# Patient Record
Sex: FEMALE | Race: WHITE | NOT HISPANIC OR LATINO | Employment: STUDENT | URBAN - METROPOLITAN AREA
[De-identification: names, ages, dates, MRNs, and addresses within clinical notes are randomized per-mention and may not be internally consistent; named-entity substitution may affect disease eponyms.]

---

## 2022-12-05 ENCOUNTER — HOSPITAL ENCOUNTER (EMERGENCY)
Facility: CLINIC | Age: 22
Discharge: LEFT BEFORE DISCHARGE | End: 2022-12-06
Attending: EMERGENCY MEDICINE | Admitting: EMERGENCY MEDICINE
Payer: COMMERCIAL

## 2022-12-05 DIAGNOSIS — Z53.21 ELOPED FROM EMERGENCY DEPARTMENT: ICD-10-CM

## 2022-12-05 DIAGNOSIS — Z72.89 SELF-INJURIOUS BEHAVIOR: ICD-10-CM

## 2022-12-05 LAB
ALBUMIN SERPL BCG-MCNC: 4.7 G/DL (ref 3.5–5.2)
ALP SERPL-CCNC: 88 U/L (ref 35–104)
ALT SERPL W P-5'-P-CCNC: 17 U/L (ref 10–35)
ANION GAP SERPL CALCULATED.3IONS-SCNC: 13 MMOL/L (ref 7–15)
AST SERPL W P-5'-P-CCNC: 20 U/L (ref 10–35)
BASOPHILS # BLD AUTO: 0.1 10E3/UL (ref 0–0.2)
BASOPHILS NFR BLD AUTO: 0 %
BILIRUB SERPL-MCNC: 0.4 MG/DL
BUN SERPL-MCNC: 13.5 MG/DL (ref 6–20)
CALCIUM SERPL-MCNC: 9.4 MG/DL (ref 8.6–10)
CHLORIDE SERPL-SCNC: 104 MMOL/L (ref 98–107)
CREAT SERPL-MCNC: 0.61 MG/DL (ref 0.51–0.95)
DEPRECATED HCO3 PLAS-SCNC: 22 MMOL/L (ref 22–29)
EOSINOPHIL # BLD AUTO: 0.2 10E3/UL (ref 0–0.7)
EOSINOPHIL NFR BLD AUTO: 2 %
ERYTHROCYTE [DISTWIDTH] IN BLOOD BY AUTOMATED COUNT: 12.6 % (ref 10–15)
ETHANOL SERPL-MCNC: <0.01 G/DL
GFR SERPL CREATININE-BSD FRML MDRD: >90 ML/MIN/1.73M2
GLUCOSE SERPL-MCNC: 89 MG/DL (ref 70–99)
HCG UR QL: NEGATIVE
HCT VFR BLD AUTO: 44.4 % (ref 35–47)
HGB BLD-MCNC: 14.4 G/DL (ref 11.7–15.7)
IMM GRANULOCYTES # BLD: 0.1 10E3/UL
IMM GRANULOCYTES NFR BLD: 0 %
LYMPHOCYTES # BLD AUTO: 4.3 10E3/UL (ref 0.8–5.3)
LYMPHOCYTES NFR BLD AUTO: 31 %
MCH RBC QN AUTO: 27.6 PG (ref 26.5–33)
MCHC RBC AUTO-ENTMCNC: 32.4 G/DL (ref 31.5–36.5)
MCV RBC AUTO: 85 FL (ref 78–100)
MONOCYTES # BLD AUTO: 0.8 10E3/UL (ref 0–1.3)
MONOCYTES NFR BLD AUTO: 6 %
NEUTROPHILS # BLD AUTO: 8.5 10E3/UL (ref 1.6–8.3)
NEUTROPHILS NFR BLD AUTO: 61 %
NRBC # BLD AUTO: 0 10E3/UL
NRBC BLD AUTO-RTO: 0 /100
PLATELET # BLD AUTO: 394 10E3/UL (ref 150–450)
POTASSIUM SERPL-SCNC: 3.6 MMOL/L (ref 3.4–5.3)
PROT SERPL-MCNC: 7.8 G/DL (ref 6.4–8.3)
RBC # BLD AUTO: 5.21 10E6/UL (ref 3.8–5.2)
SODIUM SERPL-SCNC: 139 MMOL/L (ref 136–145)
WBC # BLD AUTO: 13.8 10E3/UL (ref 4–11)

## 2022-12-05 PROCEDURE — 85025 COMPLETE CBC W/AUTO DIFF WBC: CPT | Performed by: EMERGENCY MEDICINE

## 2022-12-05 PROCEDURE — 36415 COLL VENOUS BLD VENIPUNCTURE: CPT | Performed by: EMERGENCY MEDICINE

## 2022-12-05 PROCEDURE — 99282 EMERGENCY DEPT VISIT SF MDM: CPT | Performed by: EMERGENCY MEDICINE

## 2022-12-05 PROCEDURE — 80307 DRUG TEST PRSMV CHEM ANLYZR: CPT | Performed by: EMERGENCY MEDICINE

## 2022-12-05 PROCEDURE — 99283 EMERGENCY DEPT VISIT LOW MDM: CPT | Performed by: EMERGENCY MEDICINE

## 2022-12-05 PROCEDURE — 82077 ASSAY SPEC XCP UR&BREATH IA: CPT | Performed by: EMERGENCY MEDICINE

## 2022-12-05 PROCEDURE — 80053 COMPREHEN METABOLIC PANEL: CPT | Performed by: EMERGENCY MEDICINE

## 2022-12-05 PROCEDURE — 81025 URINE PREGNANCY TEST: CPT | Performed by: EMERGENCY MEDICINE

## 2022-12-05 PROCEDURE — 250N000013 HC RX MED GY IP 250 OP 250 PS 637: Performed by: EMERGENCY MEDICINE

## 2022-12-05 RX ORDER — ACETAMINOPHEN 325 MG/1
650 TABLET ORAL ONCE
Status: COMPLETED | OUTPATIENT
Start: 2022-12-05 | End: 2022-12-05

## 2022-12-05 RX ADMIN — ACETAMINOPHEN 650 MG: 325 TABLET, FILM COATED ORAL at 23:17

## 2022-12-05 ASSESSMENT — ENCOUNTER SYMPTOMS
DIFFICULTY URINATING: 0
HEADACHES: 1
SORE THROAT: 0
NECK PAIN: 1
ABDOMINAL PAIN: 0
CONFUSION: 0
DYSPHORIC MOOD: 1
ARTHRALGIAS: 0
NECK STIFFNESS: 0
HALLUCINATIONS: 0
EYE REDNESS: 0
NAUSEA: 0
DIARRHEA: 0
COLOR CHANGE: 0
VOMITING: 0
FEVER: 0
SHORTNESS OF BREATH: 1
CHILLS: 0

## 2022-12-05 ASSESSMENT — ACTIVITIES OF DAILY LIVING (ADL): ADLS_ACUITY_SCORE: 35

## 2022-12-06 VITALS
SYSTOLIC BLOOD PRESSURE: 125 MMHG | RESPIRATION RATE: 16 BRPM | DIASTOLIC BLOOD PRESSURE: 93 MMHG | HEART RATE: 80 BPM | OXYGEN SATURATION: 100 % | TEMPERATURE: 98.2 F

## 2022-12-06 LAB
AMPHETAMINES UR QL SCN: ABNORMAL
BARBITURATES UR QL SCN: ABNORMAL
BENZODIAZ UR QL SCN: ABNORMAL
BZE UR QL SCN: ABNORMAL
CANNABINOIDS UR QL SCN: ABNORMAL
OPIATES UR QL SCN: ABNORMAL

## 2022-12-06 ASSESSMENT — ACTIVITIES OF DAILY LIVING (ADL)
ADLS_ACUITY_SCORE: 35
ADLS_ACUITY_SCORE: 35

## 2022-12-06 NOTE — SIGNIFICANT EVENT
Pt was 1:1 with sitter in ED room 5, ED RN made aware that pt had eloped. Sitter reports pt stood up in room and approached the door and walked out, she then ran to the ED entrance doors where sitter reports doors happened to open and pt ran out. As she was running near desk sitter reports she needed help as the pt was leaving. Security and charge nurse notified.   Pt was accessed by RN and ED provider but had yet to be evaluated by the DEC assessment. When RN did assessment pt denies SI. However did report self harm to hurt herself. Security aware and looking for pt, currently not on 72 hr hold.

## 2022-12-06 NOTE — PROGRESS NOTES
called ED to see if DEC was still requested. Per ED HUC, pt has left ED against medical advice and for this reason, consult with DEC can be cancelled.     DAMIR Washington, LADC, LPCC

## 2022-12-06 NOTE — ED TRIAGE NOTES
Patient hit her head on the dressing multiple times intentionally. Patient spouse is here and states they had to restrain the patient from continuing to hurt herself.

## 2022-12-06 NOTE — ED NOTES
Patient asked in private if she felt safe around her spouse and she stated she did feel safe. Patient stated she is comofrtable with spouse in the room with her. Security notified to come wand the patient. 1:1 observation started to ensure patient safety.

## 2022-12-06 NOTE — ED PROVIDER NOTES
"ED Provider Note  [facility]      History     Chief Complaint   Patient presents with     Concussion     Suicidal     HPI  Carolin Bradley is an otherwise-healthy 21 year old female who presents the ED today reporting a mental health issue and headache.  Patient has a history of borderline personality disorder, depression, and anxiety.  She is not treated for this, states that previously she had been prescribed a medication but does not know what it was and has not taken it.  She recently moved here 4 days ago.  She was living in [place] for several months, then moved to [place] to live with her mother, and then moved to the [facility] 4 days ago with her wife ( they have been  for 1 year).  Today she and her wife were having a conversation, and her wife gave her \"an ultimatum\" where her wife stated that she wanted them to have an open relationship.  The patient reported she did not want this and told her wife of her objection. Her wife then said that they could either have an open relationship or go their separate ways.  At that point, the patient had a panic attack, began hyperventilating and started hitting her head against the wall, a piece of furniture, and hitting herself with her hands.  She states she was not trying to kill herself but did have urges to hurt herself.  She says that she does this when she becomes overwhelmed.  She does not have a therapist or psychiatrist.  She expresses significant concern about how her wife might have access to a larger pool of potential partners here in the [facility] and this is worrisome for her.  She denies any auditory hallucinations.  She smokes marijuana on a daily basis, but denies other drug use and denies alcohol use.  When asked if she still has thoughts about hurting herself, she says \"I don't know\". Denies active SI.     Reports last tetanus was 2 months ago.    Since this happened she is noticing headache, jaw pain, neck " "pain, and dizziness.  She denies any fevers, chills, nasal congestion, or sore throat.  No cough.  She did have shortness of breath during her panic attack earlier today.  Denies chest pain, abdominal pain, nausea, vomiting, or diarrhea.  When asked about the possibility of pregnancy, the patient states \"very unlikely\".    Past Medical History  Past Medical History:   Diagnosis Date     Other dyspnea and respiratory abnormality      Past Surgical History:   Procedure Laterality Date     NO HISTORY OF SURGERY       BENADRYL 12.5 MG/5ML OR ELIX  TYLENOL 325 MG OR TABS      No Known Allergies  Family History  Family History   Problem Relation Age of Onset     Respiratory Father         ASTHMA     Breast Cancer Paternal Grandmother      Heart Disease Maternal Grandfather         MI     Gastrointestinal Disease Maternal Grandfather         gall stones     Diabetes Maternal Grandmother         extended Fm Hx     Cancer Paternal Aunt         ovarian, uterine ?     Social History   Social History     Tobacco Use     Smoking status: Never     Tobacco comments:     non smoking home      Past medical history, past surgical history, medications, allergies, family history, and social history were reviewed with the patient. No additional pertinent items.       Review of Systems   Constitutional: Negative for chills and fever.   HENT: Negative for congestion and sore throat.         Jaw pain     Eyes: Negative for redness.   Respiratory: Positive for shortness of breath.    Cardiovascular: Negative for chest pain.   Gastrointestinal: Negative for abdominal pain, diarrhea, nausea and vomiting.   Genitourinary: Negative for difficulty urinating.   Musculoskeletal: Positive for neck pain. Negative for arthralgias and neck stiffness.   Skin: Negative for color change.   Neurological: Positive for headaches.   Psychiatric/Behavioral: Positive for dysphoric mood and self-injury. Negative for confusion, hallucinations and suicidal ideas. "   All other systems reviewed and are negative.    A complete review of systems was performed with pertinent positives and negatives noted in the HPI, and all other systems negative.    Physical Exam   BP: 118/80  Pulse: 87  Temp: 98.2  F (36.8  C)  Resp: 16  SpO2: 96 %  Physical Exam  Vitals and nursing note reviewed.   Constitutional:       General: She is not in acute distress.     Appearance: She is not diaphoretic.      Comments: Adult female, alert, cooperative. Very soft voice.    HENT:      Head: Normocephalic.      Comments: Superficial abrasions noted over left frontal region.  Tender to palpation in this region     Mouth/Throat:      Mouth: Mucous membranes are moist.      Pharynx: Oropharynx is clear.      Comments: Tender to palpation over right TMJ  Eyes:      Extraocular Movements: EOM normal.      Pupils: Pupils are equal, round, and reactive to light.   Neck:      Comments: Right paraspinous muscle tenderness to palpation with slight midline tenderness in the mid C-spine.  Cardiovascular:      Rate and Rhythm: Normal rate and regular rhythm.      Pulses: Normal pulses.      Heart sounds: Normal heart sounds. No murmur heard.  Pulmonary:      Effort: Pulmonary effort is normal. No respiratory distress.      Breath sounds: Normal breath sounds.   Chest:      Chest wall: No tenderness.   Abdominal:      General: Bowel sounds are normal.      Palpations: Abdomen is soft.      Tenderness: There is no abdominal tenderness.   Musculoskeletal:         General: Normal range of motion.      Cervical back: Tenderness present.      Thoracic back: No tenderness.      Lumbar back: No tenderness.   Skin:     Findings: No abrasion or laceration.   Neurological:      General: No focal deficit present.      Mental Status: She is alert and oriented to person, place, and time.      GCS: GCS eye subscore is 4. GCS verbal subscore is 5. GCS motor subscore is 6.      Cranial Nerves: Cranial nerves 2-12 are intact.       Sensory: Sensation is intact.      Motor: Motor function is intact.      Coordination: Coordination is intact.   Psychiatric:         Mood and Affect: Mood is depressed. Affect is flat.      Comments: Unremarkable appearance.  Flat affect.  Very soft voice, difficult to hear.  Denies SI but continues to profess thoughts of self-harm.  Does not appear to be obviously attending to internal stimuli.  Insight moderate.         ED Course      Procedures       The medical record was reviewed and interpreted.  Current labs reviewed and interpreted.  Mental Health Risk Assessment      PSS-3    Date and Time Over the past 2 weeks have you felt down, depressed, or hopeless? Over the past 2 weeks have you had thoughts of killing yourself? Have you ever attempted to kill yourself? When did this last happen? User   12/05/22 2141 yes yes yes within the last month (but not today) JJG      C-SSRS ([place])    Date and Time Q1 Wished to be Dead (Past Month) Q2 Suicidal Thoughts (Past Month) Q3 Suicidal Thought Method Q4 Suicidal Intent without Specific Plan Q5 Suicide Intent with Specific Plan Q6 Suicide Behavior (Lifetime) Within the Past 3 Months? RETIRED: Level of Risk per Screen Screening Not Complete User   12/05/22 2141 yes yes no no no yes -- -- -- JJG                     Results for orders placed or performed during the hospital encounter of 12/05/22   Comprehensive metabolic panel     Status: Normal   Result Value Ref Range    Sodium 139 136 - 145 mmol/L    Potassium 3.6 3.4 - 5.3 mmol/L    Chloride 104 98 - 107 mmol/L    Carbon Dioxide (CO2) 22 22 - 29 mmol/L    Anion Gap 13 7 - 15 mmol/L    Urea Nitrogen 13.5 6.0 - 20.0 mg/dL    Creatinine 0.61 0.51 - 0.95 mg/dL    Calcium 9.4 8.6 - 10.0 mg/dL    Glucose 89 70 - 99 mg/dL    Alkaline Phosphatase 88 35 - 104 U/L    AST 20 10 - 35 U/L    ALT 17 10 - 35 U/L    Protein Total 7.8 6.4 - 8.3 g/dL    Albumin 4.7 3.5 - 5.2 g/dL    Bilirubin Total 0.4 <=1.2 mg/dL    GFR Estimate  >90 >60 mL/min/1.73m2   HCG qualitative urine     Status: Normal   Result Value Ref Range    hCG Urine Qualitative Negative Negative   Alcohol     Status: Normal   Result Value Ref Range    Alcohol ethyl <0.01 <=0.01 g/dL   CBC with platelets and differential     Status: Abnormal   Result Value Ref Range    WBC Count 13.8 (H) 4.0 - 11.0 10e3/uL    RBC Count 5.21 (H) 3.80 - 5.20 10e6/uL    Hemoglobin 14.4 11.7 - 15.7 g/dL    Hematocrit 44.4 35.0 - 47.0 %    MCV 85 78 - 100 fL    MCH 27.6 26.5 - 33.0 pg    MCHC 32.4 31.5 - 36.5 g/dL    RDW 12.6 10.0 - 15.0 %    Platelet Count 394 150 - 450 10e3/uL    % Neutrophils 61 %    % Lymphocytes 31 %    % Monocytes 6 %    % Eosinophils 2 %    % Basophils 0 %    % Immature Granulocytes 0 %    NRBCs per 100 WBC 0 <1 /100    Absolute Neutrophils 8.5 (H) 1.6 - 8.3 10e3/uL    Absolute Lymphocytes 4.3 0.8 - 5.3 10e3/uL    Absolute Monocytes 0.8 0.0 - 1.3 10e3/uL    Absolute Eosinophils 0.2 0.0 - 0.7 10e3/uL    Absolute Basophils 0.1 0.0 - 0.2 10e3/uL    Absolute Immature Granulocytes 0.1 <=0.4 10e3/uL    Absolute NRBCs 0.0 10e3/uL   CBC with platelets differential     Status: Abnormal    Narrative    The following orders were created for panel order CBC with platelets differential.  Procedure                               Abnormality         Status                     ---------                               -----------         ------                     CBC with platelets and d...[[id number]]  Abnormal            Final result                 Please view results for these tests on the individual orders.   Urine Drugs of Abuse Screen     Status: None (In process)    Narrative    The following orders were created for panel order Urine Drugs of Abuse Screen.  Procedure                               Abnormality         Status                     ---------                               -----------         ------                     Drug abuse screen 1 urin...[[id number]]                       In process                   Please view results for these tests on the individual orders.     Medications   acetaminophen (TYLENOL) tablet 650 mg (650 mg Oral Given 12/5/22 6422)        Assessments & Plan (with Medical Decision Making)   Patient presents to the emergency department today for the above complaints.  I did evaluate her here in the ED.  It sounds as if she received some upsetting news from her wife and had a panic attack, at which point she banged her head against the wall and furniture several times.  She is complaining of a headache.    She does have some tenderness along the jaw and neck, and CT scans of head, neck and face were ordered.    We had ordered a DEC assessment.  Patient denied suicidal ideation but did endorse some possible continuing thoughts of self-harm.    She was on a one-to-one sitter.  At some point while awaiting her remote DEC assessment, she evidently stood up, grabbed her belongings, walked past the sitter out the exam room door and headed for the exit. The ED doors were open and she ran out the door to the lobby and then out of the hospital.     Nursing staff and security did notify me immediately.  We did go outside but did not see the patient.  As she was not suicidal, we will not notify the police.  Compass report will be filed and ED leadership will be notified as well.    I have reviewed the nursing notes. I have reviewed the findings, diagnosis, plan and need for follow up with the patient.    New Prescriptions    No medications on file       Final diagnoses:   Self-injurious behavior   Eloped from emergency department       --  Carolin Solomon MD   [facility] EMERGENCY DEPARTMENT  12/5/2022     Carolin Solomon MD  12/06/22 0056

## 2022-12-06 NOTE — ED NOTES
"Staff notified that pt is leaving out the ED locked doors between Triage A & B from ED 5. Pt did have a 1:1 sitter in her room when this event occurred. 1:1 staff member stated the \"pt is leaving\" when the patient was walking out the doors to the ED lobby. These locked doors happened to be open at this time. Pt took off out the lobby and outside down the [place]. A  saw the patient crossing the intersection to the [place] and then was not seen. Security notified dispatch. Pt is not on a 72 hr hold as was waiting for a DEC assessment.   "

## 2023-01-09 ENCOUNTER — VIRTUAL VISIT (OUTPATIENT)
Dept: INTERNAL MEDICINE | Facility: CLINIC | Age: 23
End: 2023-01-09
Payer: COMMERCIAL

## 2023-01-09 DIAGNOSIS — F60.3 BORDERLINE PERSONALITY DISORDER (H): Primary | ICD-10-CM

## 2023-01-09 DIAGNOSIS — F41.9 ANXIETY: ICD-10-CM

## 2023-01-09 DIAGNOSIS — F32.A DEPRESSION, UNSPECIFIED DEPRESSION TYPE: ICD-10-CM

## 2023-01-09 PROCEDURE — 99204 OFFICE O/P NEW MOD 45 MIN: CPT | Mod: 95 | Performed by: INTERNAL MEDICINE

## 2023-01-09 PROCEDURE — 96127 BRIEF EMOTIONAL/BEHAV ASSMT: CPT | Performed by: INTERNAL MEDICINE

## 2023-01-09 RX ORDER — BUPROPION HYDROCHLORIDE 150 MG/1
150 TABLET ORAL EVERY MORNING
Qty: 90 TABLET | Refills: 0 | Status: SHIPPED | OUTPATIENT
Start: 2023-01-09 | End: 2023-04-03

## 2023-01-09 ASSESSMENT — ANXIETY QUESTIONNAIRES
GAD7 TOTAL SCORE: 16
5. BEING SO RESTLESS THAT IT IS HARD TO SIT STILL: MORE THAN HALF THE DAYS
6. BECOMING EASILY ANNOYED OR IRRITABLE: NEARLY EVERY DAY
1. FEELING NERVOUS, ANXIOUS, OR ON EDGE: SEVERAL DAYS
GAD7 TOTAL SCORE: 16
4. TROUBLE RELAXING: SEVERAL DAYS
2. NOT BEING ABLE TO STOP OR CONTROL WORRYING: NEARLY EVERY DAY
1. FEELING NERVOUS, ANXIOUS, OR ON EDGE: SEVERAL DAYS
7. FEELING AFRAID AS IF SOMETHING AWFUL MIGHT HAPPEN: NEARLY EVERY DAY
7. FEELING AFRAID AS IF SOMETHING AWFUL MIGHT HAPPEN: NEARLY EVERY DAY
IF YOU CHECKED OFF ANY PROBLEMS ON THIS QUESTIONNAIRE, HOW DIFFICULT HAVE THESE PROBLEMS MADE IT FOR YOU TO DO YOUR WORK, TAKE CARE OF THINGS AT HOME, OR GET ALONG WITH OTHER PEOPLE: VERY DIFFICULT
7. FEELING AFRAID AS IF SOMETHING AWFUL MIGHT HAPPEN: NEARLY EVERY DAY
5. BEING SO RESTLESS THAT IT IS HARD TO SIT STILL: MORE THAN HALF THE DAYS
3. WORRYING TOO MUCH ABOUT DIFFERENT THINGS: NEARLY EVERY DAY
IF YOU CHECKED OFF ANY PROBLEMS ON THIS QUESTIONNAIRE, HOW DIFFICULT HAVE THESE PROBLEMS MADE IT FOR YOU TO DO YOUR WORK, TAKE CARE OF THINGS AT HOME, OR GET ALONG WITH OTHER PEOPLE: EXTREMELY DIFFICULT
8. IF YOU CHECKED OFF ANY PROBLEMS, HOW DIFFICULT HAVE THESE MADE IT FOR YOU TO DO YOUR WORK, TAKE CARE OF THINGS AT HOME, OR GET ALONG WITH OTHER PEOPLE?: EXTREMELY DIFFICULT
GAD7 TOTAL SCORE: 16
2. NOT BEING ABLE TO STOP OR CONTROL WORRYING: MORE THAN HALF THE DAYS
6. BECOMING EASILY ANNOYED OR IRRITABLE: NEARLY EVERY DAY
GAD7 TOTAL SCORE: 16
3. WORRYING TOO MUCH ABOUT DIFFERENT THINGS: NEARLY EVERY DAY

## 2023-01-09 ASSESSMENT — PATIENT HEALTH QUESTIONNAIRE - PHQ9
5. POOR APPETITE OR OVEREATING: MORE THAN HALF THE DAYS
SUM OF ALL RESPONSES TO PHQ QUESTIONS 1-9: 15
10. IF YOU CHECKED OFF ANY PROBLEMS, HOW DIFFICULT HAVE THESE PROBLEMS MADE IT FOR YOU TO DO YOUR WORK, TAKE CARE OF THINGS AT HOME, OR GET ALONG WITH OTHER PEOPLE: EXTREMELY DIFFICULT
SUM OF ALL RESPONSES TO PHQ QUESTIONS 1-9: 15

## 2023-01-09 NOTE — PROGRESS NOTES
Carolin is a 22 year old who is being evaluated via a billable video visit.      How would you like to obtain your AVS? MyChart  If the video visit is dropped, the invitation should be resent by: Text to cell phone: [phone]  Will anyone else be joining your video visit? No          Assessment & Plan     Borderline personality disorder (H)  Please note I informed patient that these types of clinical visits via video visit are challenging.  We discussed the options available.  She informs me that she is not going back to the emergency room and has no interest in being seen there at this point as she feels that the emergency room did not help her.  She informs me that she will not be assessed via an inpatient mental health unit either.  She feels that her best option at this point is to start back on her Wellbutrin which she was prescribed in the past and then try to get into see a mental health provider.  I reviewed with her her PHQ-9 and DEL scores and expressed my concern.  At the present time she reflects to me that although she has had harmful thoughts in the past currently she has no active thoughts of a plan or suicidal ideation or harmful thoughts to others.  I reviewed this with her several times.  She was advised to contact us immediately if she develops these symptoms and also be seen in the ER for Empath assessment.  She is agreeable with his clinical course and really just wants to get back on Wellbutrin and get scheduled to see a mental health provider.  She states that in the past she was given Wellbutrin  mg daily.  After discussion with the patient I am comfortable with starting her on medication and then placing a referral for the mental health provider.  Of note I will daniel this referral of relative urgent need.  -Adult Mental Health  referral      (F32.A) Depression, unspecified depression type  Comment: Discussed options with patient.  She is interested in starting back on the  medicine she was previously prescribed.  Plan: buPROPion (WELLBUTRIN XL) 150 MG 24 hr tablet,         Adult Mental Health  Referral            (F41.9) Anxiety  Comment: Discussed dosing of medication.  We will not start her back on 300 mg immediately but will titrate upwards as tolerated  Plan: buPROPion (WELLBUTRIN XL) 150 MG 24 hr tablet,         Adult Mental Health  Referral                         Depression Screening Follow Up    PHQ 1/9/2023   PHQ-9 Total Score 15   Q9: Thoughts of better off dead/self-harm past 2 weeks Nearly every day   F/U: Thoughts of suicide or self-harm Yes   F/U: Self harm-plan Yes   F/U: Self-harm action Yes   F/U: Safety concerns Yes       Discussed the following ways the patient can remain in a safe environment:  be around others  See Patient Instructions    Return in about 2 weeks (around 1/23/2023) for mental health provider.    Pop Sung MD  [facility]      This patient has never seen me prior.  Carolin is a 22 year old, presenting for the following health issues:  ER F/U and Medication Request    Patient was apparently seen in the emergency room in the first part of December of last year at which time she had a notable history of borderline personality disorder, anxiety and depression.  The patient was [place] and moved to the [facility] around this time.  She is  and her wife and she apparently had a conversation regarding the fact that her wife wanted to be in an open relationship.  This caused significant duress to the patient at which time she apparently attempted self-harm without any distinct suicidal ideation.  In the emergency room she was seen.  She was noted to have a history of THC use. She underwent formal evaluation for which a DEC assessment was ordered.  Apparently at some point during the formal assessment the patient left the emergency room AGAINST MEDICAL  ADVICE.    HPI   ED/UC Followup:    Facility:  [facility] ER  Date of visit: 12/5/22  Reason for visit: Self-injurious behavior, eloped from ER   Current Status: No change. Patient states hx of boarderline personality disorder.  Difficulty regulating moods. Would like to discuss restarting medications, was on Wellbutrin 1+ yr ago     Depression and Anxiety Follow-Up    How are you doing with your depression since your last visit? Worsened     How are you doing with your anxiety since your last visit?  Worsened     Are you having other symptoms that might be associated with depression or anxiety? No    Have you had a significant life event? Relationship Concerns     Do you have any concerns with your use of alcohol or other drugs? No    Patient informs me that she was treated with Wellbutrin by a mental health provider apparently in [place] last year.  She apparently took the medicine for short period of time but then stopped the medicine and has not been on the med since.  She did not feel that she was intolerant to the medicine nor had any allergic response, she just did not fill the medication upon completion.  She is interested in being reinitiated on therapy.    Upon close questioning in the past she has had harmful thoughts and ideation but at the present time does not feel that these thoughts are such to the point where she would harm her self or others.      Social History     Tobacco Use     Smoking status: Some Days     Types: Cigarettes     Smokeless tobacco: Never     Tobacco comments:     non smoking home   Vaping Use     Vaping Use: Never used     PHQ 1/9/2023   PHQ-9 Total Score 16   Q9: Thoughts of better off dead/self-harm past 2 weeks Nearly every day     DEL-7 SCORE 1/9/2023   Total Score 16     Last PHQ-9 1/9/2023   1.  Little interest or pleasure in doing things 1   2.  Feeling down, depressed, or hopeless 2   3.  Trouble falling or staying asleep, or sleeping too much 1   4.  Feeling tired or having  little energy 2   5.  Poor appetite or overeating 2   6.  Feeling bad about yourself 2   7.  Trouble concentrating 3   8.  Moving slowly or restless 0   Q9: Thoughts of better off dead/self-harm past 2 weeks 3   PHQ-9 Total Score 16   Difficulty at work, home, or with people Very difficult     DEL-7  1/9/2023   1. Feeling nervous, anxious, or on edge 1   2. Not being able to stop or control worrying 2   3. Worrying too much about different things 3   4. Trouble relaxing 2   5. Being so restless that it is hard to sit still 2   6. Becoming easily annoyed or irritable 3   7. Feeling afraid, as if something awful might happen 3   DEL-7 Total Score 16   If you checked any problems, how difficult have they made it for you to do your work, take care of things at home, or get along with other people? Very difficult       Review of Systems   CONSTITUTIONAL: NEGATIVE for fever, chills, change in weight  EYES: NEGATIVE for vision changes or irritation  ENT/MOUTH: NEGATIVE for ear, mouth and throat problems  RESP: NEGATIVE for significant cough or SOB  CV: NEGATIVE for chest pain, palpitations or peripheral edema  GI: NEGATIVE for nausea, abdominal pain, heartburn, or change in bowel habits  : NEGATIVE for frequency, dysuria, or hematuria  MUSCULOSKELETAL: NEGATIVE for significant arthralgias or myalgia  HEME: NEGATIVE for bleeding problems      Objective         Vitals:  No vitals were obtained today due to virtual visit.    Physical Exam   GENERAL: alert and no distress  EYES: Eyes grossly normal to inspection.  No discharge or erythema, or obvious scleral/conjunctival abnormalities.  RESP: No audible wheeze, cough, or visible cyanosis.  No visible retractions or increased work of breathing.    SKIN: Visible skin clear. No significant rash, abnormal pigmentation or lesions.  NEURO: Cranial nerves grossly intact.  Mentation and speech appropriate for age.  PSYCH: Mentation appears normal, affect flat, judgement and insight  intact, normal speech and appearance well-groomed.            Video-Visit Details    Type of service:  Video Visit   Video Start Time:  300 PM  Video End Time: 315pm    Originating Location (pt. Location): Home    Distant Location (provider location):  On-site  Platform used for Video Visit: Julia

## 2023-01-09 NOTE — PROGRESS NOTES
Carolin is a 22 year old who is being evaluated via a billable video visit.      How would you like to obtain your AVS? MyChart  If the video visit is dropped, the invitation should be resent by: Text to cell phone: [phone]  Will anyone else be joining your video visit? No  {If patient encounters technical issues they should call [phone] :683594}        {PROVIDER CHARTING PREFERENCE:207255}    Subjective   Carolin is a 22 year old{ACCOMPANIED BY STATEMENT (Optional):083201}, presenting for the following health issues:  ER F/U      HPI     ED/UC Followup:    Facility:  [facility]  Date of visit: 12/5/22  Reason for visit: Self-injurious behavior, eloped from ER   Current Status: No change. Patient states hx of boarderline personality disorder.  Difficulty regulating moods. Would like to discuss restarting medications, was on Wellbutrin 1+ yr ago     Depression and Anxiety Follow-Up    How are you doing with your depression since your last visit? Worsened     How are you doing with your anxiety since your last visit?  Worsened     Are you having other symptoms that might be associated with depression or anxiety? No    Have you had a significant life event? Relationship Concerns     Do you have any concerns with your use of alcohol or other drugs? No    Social History     Tobacco Use     Smoking status: Some Days     Types: Cigarettes     Smokeless tobacco: Never     Tobacco comments:     non smoking home   Vaping Use     Vaping Use: Never used     PHQ 1/9/2023   PHQ-9 Total Score 16   Q9: Thoughts of better off dead/self-harm past 2 weeks Nearly every day     DEL-7 SCORE 1/9/2023   Total Score 16     Last PHQ-9 1/9/2023   1.  Little interest or pleasure in doing things 1   2.  Feeling down, depressed, or hopeless 2   3.  Trouble falling or staying asleep, or sleeping too much 1   4.  Feeling tired or having little energy 2   5.  Poor appetite or overeating 2   6.  Feeling bad about yourself 2   7.  Trouble  "concentrating 3   8.  Moving slowly or restless 0   Q9: Thoughts of better off dead/self-harm past 2 weeks 3   PHQ-9 Total Score 16   Difficulty at work, home, or with people Very difficult     DEL-7  1/9/2023   1. Feeling nervous, anxious, or on edge 1   2. Not being able to stop or control worrying 2   3. Worrying too much about different things 3   4. Trouble relaxing 2   5. Being so restless that it is hard to sit still 2   6. Becoming easily annoyed or irritable 3   7. Feeling afraid, as if something awful might happen 3   DEL-7 Total Score 16   If you checked any problems, how difficult have they made it for you to do your work, take care of things at home, or get along with other people? Very difficult     { Link to C-SSRS (Brief [place]) Flowsheet :461899}  {C-SSRS results from today (Optional):757917}  {Add Brief [place] to Note (Optional):375794}    Follow Up Actions Taken  {ACTIONS TAKEN:843863::\"Crisis resource information provided in the After Visit Summary\"}     Discussed the following ways the patient can remain in a safe environment:  {SAFE ENVIRONMENT:891638}  Suicide Assessment Five-step Evaluation and Treatment (SAFE-T)  {Provider  Link to Depression Care Package SmartSet :546736}        {additonal problems for provider to add (Optional):806867}    Review of Systems   {ROS COMP (Optional):957748}      Objective           Vitals:  No vitals were obtained today due to virtual visit.    Physical Exam   {video visit exam brief selected:758820::\"GENERAL: Healthy, alert and no distress\",\"EYES: Eyes grossly normal to inspection.  No discharge or erythema, or obvious scleral/conjunctival abnormalities.\",\"RESP: No audible wheeze, cough, or visible cyanosis.  No visible retractions or increased work of breathing.  \",\"SKIN: Visible skin clear. No significant rash, abnormal pigmentation or lesions.\",\"NEURO: Cranial nerves grossly intact.  Mentation and speech appropriate for age.\",\"PSYCH: Mentation appears " "normal, affect normal/bright, judgement and insight intact, normal speech and appearance well-groomed.\"}    {Diagnostic Test Results (Optional):579860}    {AMBULATORY ATTESTATION (Optional):295908}        Video-Visit Details    Type of service:  Video Visit   Video Start Time: {video visit start/end time for provider to select:017216}  Video End Time:{video visit start/end time for provider to select:431698}    Originating Location (pt. Location): {video visit patient location:845124::\"Home\"}  {PROVIDER LOCATION On-site should be selected for visits conducted from your clinic location or adjoining [facility] hospital, academic office, or other nearby [facility] building. Off-site should be selected for all other provider locations, including home:033144}  Distant Location (provider location):  {virtual location provider:860350}  Platform used for Video Visit: {Virtual Visit Platforms:549033::\"InstaJobWell\"}    "

## 2023-04-02 ENCOUNTER — HEALTH MAINTENANCE LETTER (OUTPATIENT)
Age: 23
End: 2023-04-02

## 2023-04-03 ENCOUNTER — MYC REFILL (OUTPATIENT)
Dept: INTERNAL MEDICINE | Facility: CLINIC | Age: 23
End: 2023-04-03
Payer: COMMERCIAL

## 2023-04-03 DIAGNOSIS — F41.9 ANXIETY: ICD-10-CM

## 2023-04-03 DIAGNOSIS — F32.A DEPRESSION, UNSPECIFIED DEPRESSION TYPE: ICD-10-CM

## 2023-04-04 RX ORDER — BUPROPION HYDROCHLORIDE 150 MG/1
150 TABLET ORAL EVERY MORNING
Qty: 90 TABLET | Refills: 0 | Status: SHIPPED | OUTPATIENT
Start: 2023-04-04

## 2023-06-28 ENCOUNTER — HOSPITAL ENCOUNTER (EMERGENCY)
Facility: CLINIC | Age: 23
Discharge: HOME | End: 2023-06-29
Attending: EMERGENCY MEDICINE | Admitting: EMERGENCY MEDICINE
Payer: COMMERCIAL

## 2023-06-28 DIAGNOSIS — R45.851 SUICIDAL IDEATION: ICD-10-CM

## 2023-06-28 DIAGNOSIS — F43.10 PTSD (POST-TRAUMATIC STRESS DISORDER): ICD-10-CM

## 2023-06-28 PROCEDURE — 99285 EMERGENCY DEPT VISIT HI MDM: CPT | Mod: 25

## 2023-06-28 PROCEDURE — 90791 PSYCH DIAGNOSTIC EVALUATION: CPT

## 2023-06-28 ASSESSMENT — ACTIVITIES OF DAILY LIVING (ADL): ADLS_ACUITY_SCORE: 35

## 2023-06-29 ENCOUNTER — TELEPHONE (OUTPATIENT)
Dept: BEHAVIORAL HEALTH | Facility: CLINIC | Age: 23
End: 2023-06-29

## 2023-06-29 VITALS
HEIGHT: 64 IN | BODY MASS INDEX: 24.17 KG/M2 | RESPIRATION RATE: 18 BRPM | TEMPERATURE: 98.4 F | DIASTOLIC BLOOD PRESSURE: 74 MMHG | SYSTOLIC BLOOD PRESSURE: 120 MMHG | HEART RATE: 94 BPM | WEIGHT: 141.6 LBS | OXYGEN SATURATION: 97 %

## 2023-06-29 PROCEDURE — 99285 EMERGENCY DEPT VISIT HI MDM: CPT | Performed by: PSYCHIATRY & NEUROLOGY

## 2023-06-29 PROCEDURE — 90791 PSYCH DIAGNOSTIC EVALUATION: CPT

## 2023-06-29 PROCEDURE — 250N000013 HC RX MED GY IP 250 OP 250 PS 637: Performed by: EMERGENCY MEDICINE

## 2023-06-29 RX ORDER — BENZONATATE 100 MG/1
100 CAPSULE ORAL ONCE
Status: DISCONTINUED | OUTPATIENT
Start: 2023-06-29 | End: 2023-06-29

## 2023-06-29 RX ORDER — PROPRANOLOL HYDROCHLORIDE 20 MG/1
20 TABLET ORAL 3 TIMES DAILY PRN
Qty: 60 TABLET | Refills: 0 | Status: SHIPPED | OUTPATIENT
Start: 2023-06-29

## 2023-06-29 RX ORDER — ESCITALOPRAM OXALATE 10 MG/1
10 TABLET ORAL DAILY
Qty: 30 TABLET | Refills: 0 | Status: SHIPPED | OUTPATIENT
Start: 2023-06-29

## 2023-06-29 RX ADMIN — NICOTINE POLACRILEX 2 MG: 2 GUM, CHEWING ORAL at 07:42

## 2023-06-29 ASSESSMENT — ACTIVITIES OF DAILY LIVING (ADL)
ADLS_ACUITY_SCORE: 35

## 2023-06-29 ASSESSMENT — COLUMBIA-SUICIDE SEVERITY RATING SCALE - C-SSRS
3. HAVE YOU BEEN THINKING ABOUT HOW YOU MIGHT KILL YOURSELF?: YES
1. IN THE PAST MONTH, HAVE YOU WISHED YOU WERE DEAD OR WISHED YOU COULD GO TO SLEEP AND NOT WAKE UP?: NO
TOTAL  NUMBER OF ABORTED OR SELF INTERRUPTED ATTEMPTS SINCE LAST CONTACT: NO
TOTAL  NUMBER OF INTERRUPTED ATTEMPTS LIFETIME: NO
2. HAVE YOU ACTUALLY HAD ANY THOUGHTS OF KILLING YOURSELF?: YES
4. HAVE YOU HAD THESE THOUGHTS AND HAD SOME INTENTION OF ACTING ON THEM?: YES
5. HAVE YOU STARTED TO WORK OUT OR WORKED OUT THE DETAILS OF HOW TO KILL YOURSELF? DO YOU INTEND TO CARRY OUT THIS PLAN?: NO
REASONS FOR IDEATION PAST MONTH: COMPLETELY TO END OR STOP THE PAIN (YOU COULDN'T GO ON LIVING WITH THE PAIN OR HOW YOU WERE FEELING)
6. HAVE YOU EVER DONE ANYTHING, STARTED TO DO ANYTHING, OR PREPARED TO DO ANYTHING TO END YOUR LIFE?: NO
ATTEMPT SINCE LAST CONTACT: NO
6. HAVE YOU EVER DONE ANYTHING, STARTED TO DO ANYTHING, OR PREPARED TO DO ANYTHING TO END YOUR LIFE?: NO
1. SINCE LAST CONTACT, HAVE YOU WISHED YOU WERE DEAD OR WISHED YOU COULD GO TO SLEEP AND NOT WAKE UP?: NO
5. HAVE YOU STARTED TO WORK OUT OR WORKED OUT THE DETAILS OF HOW TO KILL YOURSELF? DO YOU INTEND TO CARRY OUT THIS PLAN?: YES
4. HAVE YOU HAD THESE THOUGHTS AND HAD SOME INTENTION OF ACTING ON THEM?: NO
2. HAVE YOU ACTUALLY HAD ANY THOUGHTS OF KILLING YOURSELF?: NO
2. HAVE YOU ACTUALLY HAD ANY THOUGHTS OF KILLING YOURSELF?: YES
TOTAL  NUMBER OF ABORTED OR SELF INTERRUPTED ATTEMPTS LIFETIME: NO
REASONS FOR IDEATION LIFETIME: COMPLETELY TO END OR STOP THE PAIN (YOU COULDN'T GO ON LIVING WITH THE PAIN OR HOW YOU WERE FEELING)
TOTAL  NUMBER OF INTERRUPTED ATTEMPTS SINCE LAST CONTACT: NO
1. HAVE YOU WISHED YOU WERE DEAD OR WISHED YOU COULD GO TO SLEEP AND NOT WAKE UP?: YES
SUICIDE, SINCE LAST CONTACT: NO
ATTEMPT LIFETIME: NO

## 2023-06-29 NOTE — CONSULTS
Diagnostic Evaluation Consultation  Crisis Assessment    Patient was assessed: In Person  Patient location: declocations: [facility] Emergency Department  Was a release of information signed: No. Reason: No current providers      Referral Data and Chief Complaint  Carolin Bradley is a 22 year old, who uses she/her pronouns, and presents to the ED via EMS. Patient is referred to the ED by community provider(s). Patient is presenting to the ED for the following concerns: Suicidal.      Informed Consent and Assessment Methods     Patient is her own guardian. Writer met with patient and explained the crisis assessment process, including applicable information disclosures and limits to confidentiality, assessed understanding of the process, and obtained consent to proceed with the assessment. Patient was observed to be able to participate in the assessment as evidenced by verbal consent and engagement. Assessment methods included conducting a formal interview with patient, review of medical records, collaboration with medical staff, and obtaining relevant collateral information from family and community providers when available.     Over the course of this crisis assessment provided reassurance, offered validation, engaged patient in problem solving and disposition planning and provided psychoeducation. Patient's response to interventions was irritable and frustrated. Patient appeared to minimize symptoms and intensity of thoughts.     Summary of Patient Situation  Patient was brought to the emergency room via ambulance after her friends called crisis line. Writer approached patient in the common area of the behavior suite. She was agreeable to completing assessment in the common area, as all other patient were in their room with doors shut. Patient immediatly states that there is no reason for her, the situation was blown out of control and she needs to go home to be able to go to work tomorrow at noon.     Patient reports  "that today she was going to lay down in the basement of her house, but due to an episode yesterday (she would not elaborate what the episode was) her roommates did not trust her to be alone. A friend went to sit in the basement with her. Eventually a crisis  should up and they talked for awhile. Patient states that the thoughts she has in the back of her mind started to bubble out; \"Eventually I will end up killing myself. Might not be today, next week. I don't when but someday, sooner or later, that is how I die\". Statement made the  think was an immediate risk for suicide. Patient says she believes she has not worked up the courage yet but feels it is inevitable.  asked supervisor what to do, they said call police and transport to the emergency room. Patient reports when police arrived she was handcuffed and dragged to the the ambulance.     Attempted to talk with patient about safety at home, patient reports that her way of distracting herself from suicidal thoughts is to self harm, that she has thoughts of banging her head till her skull cracks or causes a brain aneurysm. She reports that she will bang her head till her brain feels shaken up and she is disoriented. She acknowledges the negative physical side effects she has experienced due to this, memory loss, confusion, and poor balance. Patient reports that she will also cut herself as self harm to not have suicidal thoughts. Patient was unable to identify any coping skills.     See collateral section for additional information.    Brief Psychosocial History  - Current living situation: Lives with 3 roommates in a house. [place].      - Brief family history: Dad lives in [place] and is dying of cancer. Mom is in [place], raising her sister. Has an 8 year old sister. Not sure if there is a history of mental health or substance use, thinks dad might have some struggles " "from the     - School/ Work Functioning: Functional at work. Reports getting alright sleep and not eating the best due to having to  after roommates.     - Employment and income source - financial concerns: Working at [email or web address] part time.     - [place] status: No    - Hobbies: Hanging out with her pet rat, playing games on wii or craft.     - Supports: \"I don't think I have anyone anymore\"    - Relevant legal issues: No    - Cultural, Shinto, or spiritual influences on mental health care: Daily practice to smudge    Significant Clinical History  - History of mental health (and) substance use crisis: Mental health struggles have been for the past decade, everything she feels is dialed up to \"1000\". Reports having really big feelings. Struggle coping and cannot identify coping skills. Reports no substance use. Per chart review and collateral patient smoke marijuana daily.       - Symptom and diagnosis history: Patient reports historical diagnosis of borderline personality disorder. She identifies her symptom as  frequent mood changes and no other symptoms.      - Historic treatment team: Therapy in the past. Previously seeing therapist at [address] [place] [address]. There is a [place] close to her house that she would consider establishing care for outpatient services.     - Past hospitalization, commitments, [facility], treatment programs, and other therapuetic efforts: Inpatient in [facility]. No commitment.Per chart review patient was at [facility] 6 months ago for similar mental health struggles and eloped from the ED.     - Relevant trauma history: \"Standard trauma of being a woman\", will not elaborate.      Collateral Information  The following information was received from Yarelis Bradley whose relationship to the patient is Mother. Information was obtained via phone. Their phone number is [phone] and they last had contact with patient last night.    What happened today: Yarelis was " "told after she arrived to the emergency room. She was making comments about doing self harm and talking about what she would do to kill herself. She has continually been trying to do self harm for past few months. She is in denial that she needs help. Blaming others for why she is in the emergency room. She is tired and wanting to go to bed. Plans to have patient come home with her Monday-Saturday.     What is different about patient's functioning: Her enthusiasm for thins is down. She is tired all the time. Engages in self harm, hitting her head and cutting herself.     Concern about alcohol/drug use: No    What do you think the patient needs: She needs a good support system with outpatient providers. She needs to get out of the environment she is in. Stay inpatient or in ED for at least 24 hours.     Has patient made comments about wanting to kill themselves/others:  Yes she would say things like \"I can't do this\", \"I can't live anymore\", \"You guys will be fine without me\"    If d/c is recommended, can they take part in safety/aftercare planning: Yes Yarelis lives in [place].     Other information: Last year in June her partner left her and went to [place], Carolin was distraught so she moved to [place] for 3-4 months. They both came back to [place] for 2 months and then to the [facility] since December 1. This when everything started crashing down and not wanting to be with her but be her friend. A few days ago they lived with Carolin's dad for a few days. Using Carolin for support and security, but does not want the same kind of relationship as Carolin does.     Additional Collateral Information    The following information was received from João Sutton whose relationship to the patient is Best Friend. Information was obtained via phone. Their phone number is [phone] and they last had contact with patient yesterday.    What happened today: Past 2 weeks seeing Carolin cutting and head bashing against objects and wall. Both she " states are suicide attempts. Yesterday continuing to do these things, João has screenshots of patient stating these are suicide attempt and plans for other attempts. There are also pictures of bloody objects and walls around the house from where Carolin was bashing her head. Bolted outside and walked with Carolin around city, looking at cars and tracks like she was going to jump in front of them Walked to train station to buy drugs to overdose. Carolin home and tried to do safety plan. Friends and family wanted to introduce inpatient the next day. Eventually got plan together but Carolin found out what friends were doing, isolated themself stated she had plans and a lot of intention to commit suicide. Called crisis services. Spent time with social workers who worked through all options with Carolin, she refused any help. Denied any suicidal ideation despite text proof. Workers decided it was unsafe and sent for emergency services. João had to restrain Carolin till  came because she planned suicide by  by being aggressive toward police.     What is different about patient's functioning: Confusion, memory loss, aggressive violent behavior and impulsivity, increased suicidal ideation, excessive crying, risky behavior, rapid mood changes. Head bashing increased in last 2 weeks, aggressively refused to go in to get scans or help.     Concern about alcohol/drug use: Yes heavy weed user. Family history of alcoholism.     What do you think the patient needs: Immensely needing inpatient, using any object or wall.     Has patient made comments about wanting to kill themselves/others:  Yes Actively for 2 weeks    If d/c is recommended, can they take part in safety/aftercare planning: Yes but immesnly believe that they need inpatient    Other information: Actively trying to kill themself for 2 weeks.     Risk Assessment  [place] Suicide Severity Rating Scale Full Clinical Version: 6/29/23  Suicidal Ideation  1. Wish to be Dead  (Lifetime): Yes  1. Wish to be Dead (Past 1 Month): No  2. Non-Specific Active Suicidal Thoughts (Lifetime): Yes  2. Non-Specific Active Suicidal Thoughts (Past 1 Month): Yes  3. Active Suicidal Ideation with any Methods (Not Plan) Without Intent to Act (Lifetime): Yes  3. Active Suicidal Ideation with any Methods (Not Plan) Without Intent to Act (Past 1 Month): No  4. Active Suicidal Ideation with Some Intent to Act, Without Specific Plan (Lifetime): Yes  4. Active Suicidal Ideation with Some Intent to Act, Without Specific Plan (Past 1 Month): No  5. Active Suicidal Ideation with Specific Plan and Intent (Lifetime): Yes  5. Active Suicidal Ideation with Specific Plan and Intent (Past 1 Month): No  Intensity of Ideation  Most Severe Ideation Rating (Lifetime): 5  Most Severe Ideation Rating (Past 1 Month): 3  Frequency (Lifetime): 2-5 times in week  Frequency (Past 1 Month): 2-5 times in week  Duration (Lifetime): 1-4 hours/a lot of time  Duration (Past 1 Month): 1-4 hours/a lot of time  Controllability (Lifetime): Can control thoughts with a lot of difficulty  Controllability (Past 1 Month): Can control thoughts with some difficulty  Deterrents (Lifetime): Deterrents definitely stopped you from attempting suicide  Deterrents (Past 1 Month): Deterrents definitely stopped you from attempting suicide  Reasons for Ideation (Lifetime): Completely to end or stop the pain (You couldn't go on living with the pain or how you were feeling)  Reasons for Ideation (Past 1 Month): Completely to end or stop the pain (You couldn't go on living with the pain or how you were feeling)  Suicidal Behavior  Actual Attempt (Lifetime): No  Has subject engaged in non-suicidal self-injurious behavior? (Lifetime): Yes  Has subject engaged in non-suicidal self-injurious behavior? (Past 3 Months): Yes  Interrupted Attempts (Lifetime): No  Aborted or Self-Interrupted Attempt (Lifetime): No  Preparatory Acts or Behavior (Lifetime): No  C-SSRS  Risk (Lifetime/Recent)  Calculated C-SSRS Risk Score (Lifetime/Recent): Moderate Risk      Validity of evaluation is impacted by presenting factors during interview patient is very adamant that she does not want to be here and feels she does not need to be here. She is agreeable and cooperative but does not appear to be very forthcoming.    Comments regarding subjective versus objective responses to [place] tool: Both collateral contacts are very concerned about patient's ability to be safe. State that she has had many attempts including her self harm, head banging and cutting that she tells them are attempts to kill herself.   Environmental or Psychosocial Events: loss of relationship due to divorce/separation, threats to a prized relationship, work or task failure, challenging interpersonal relationships, geographic isolation from supports, barriers to accessing healthcare, helplessness/hopelessness, impulsivity/recklessness, unemployment/underemployment, other life stressors, ongoing abuse of substances and social isolation  Chronic Risk Factors: history of suicide attempts (continous attempts over last 2 weeks), history of psychiatric hospitalization, chronic and ongoing sleep difficulties, parent divorce, serious, persistent mental illness, personality disorders and history of Non-Suicidal Self Injury (NSSI)   Warning Signs: seeking access to means to hurt or kill self, talking or writing about death, dying, or suicide, hopelessness, pain (new or worsening), rage, anger, seeking revenge, acting reckless or engaging in risky activities, feeling trapped, like there is no way out, increasing substance use or abuse, withdrawing from friends, family, and society, anxiety, agitation, unable to sleep, sleeping all the time, dramatic changes in mood, no reason for living, no sense of purpose in life and engaging in self-destructive behavior  Protective Factors: lives in a responsibly safe and stable  environment  Interpretation of Risk Scoring, Risk Mitigation Interventions and Safety Plan:  Patient is identified as moderate risk via C-SSRS. Patient is more appropriately a high risk. She is not forth coming about her ideation or behaviors. She presented to [facility] 6 months ago for similar concerns and eloped from the ED. Patient's roommates needed to call crisis services due to being unable to keep her safe at home, she is constantly making statements about killing herself, wanting to die, harming herself by head banging and cutting. Patient was unable to identify any coping skills or show any insight to her thoughts and behaviors.     Does the patient have thoughts of harming others? No     Is the patient engaging in sexually inappropriate behavior?  no      Current Substance Abuse  Is there recent substance abuse? Reports no, collateral would say yes.     Was a urine drug screen or blood alcohol level obtained: No    Mental Status Exam   Affect: Labile   Appearance: Appropriate    Attention Span/Concentration: Attentive  Eye Contact: Intense   Fund of Knowledge: Appropriate    Language /Speech Content: Fluent   Language /Speech Volume: Soft    Language /Speech Rate/Productions: Minimally Responsive    Recent Memory: Variable   Remote Memory: Variable   Mood: Anxious, Depressed, Irritable and Sad    Orientation to Person: Yes    Orientation to Place: Yes   Orientation to Time of Day: Yes    Orientation to Date: Yes    Situation (Do they understand why they are here?): Yes    Psychomotor Behavior: Agitated    Thought Content: Suicidal   Thought Form: Obsessive/Perseverative      History of commitment: No     Medication  Psychotropic medications: No  Medication changes made in the last two weeks: No     Current Care Team  Primary Care Provider: No  Psychiatrist: No  Therapist: No  : No     CTSS or ARMHS: No  ACT Team: No  Other: No    Diagnosis  301.83 (F60.3) Borderline Personality Disorder    309.81 (F43.10) Posttraumatic Stress Disorder (includes Posttraumatic Stress Disorder for Children 6 Years and Younger)  With dissociative symptoms - by history   311 (F32.9) Unspecified Depressive Disorder  - by history     Clinical Summary and Substantiation of Recommendations    It is the recommendation of this clinician that pt admit to IP MH for safety and stabilization. Pt displays the following risk factors that support IP admission: Patient is identified as moderate risk via C-SSRS. Patient is more appropriately a high risk. She is not forth coming about her ideation or behaviors. She presented to [facility] 6 months ago for similar concerns and eloped from the ED. Patient's roommates needed to call crisis services due to being unable to keep her safe at home, she is constantly making statements about killing herself, wanting to die, harming herself by head banging and cutting. Patient was unable to identify any coping skills or show any insight to her thoughts and behaviors. . Pt is unable to engage in safety planning to mitigate risk level in a non-secure setting. Lower levels of care have not been successful in mitigating risk. Due to this IP is the least restrictive option of care for pt. Pt should remain in IP until deemed safe to return to the community and engage in OP MH supports. Pt will need assistance establishing OP MH services prior to discharge.    Admission to Inpatient Level of Care is indicated due to:    1. Patient risk of severity of behavioral health disorder is appropriate to proposed level of care as indicated by:    Imminent Risk of Harm: Very Recent suicide attempt or deliberate act of serious harm to self WITHOUT relief of factors precipitating the attempt or act and Current plan for suicide or serious harm to self is present  And/or:  Behavioral health disorder is present and appropriate for inpatient care with both of the following:     Severe psychiatric, behavioral or other  comorbid conditions are appropriate for management at inpatient mental health as indicated by at least one of the following:   o Negative symptoms, Depressive symptoms, Anxiety and Personality disorders, Impaired impulse control, judgement, or insight and Evidence of severely diminished ability to assess consequences of own actions    Severe dysfunction in daily living is present as indicated by at least one of the following:   o Extreme deterioration in social interactions, Complete withdrawal from all social interactions and Complete inability to maintain any appropriate aspect of personal responsibility in any adult roles    2. Inpatient mental health services are necessary to meet patient needs and at least one of the following:  Specific condition related to admission diagnosis is present and judged likely to further improve at proposed level of care and Specific condition related to admission diagnosis is present and judged likely to deteriorate in absence of treatment at proposed level of care    3. Situation and expectations are appropriate for inpatient care, as indicated by one of the following:   Patient is unwilling to participate in treatment voluntarily and requires treatment, Need for physical restraint, seclusion, or other involuntary treatment intervention is present, Around-the-clock medical and nursing care to address symptoms and initiate intervention is required and Patient management/treatment at lower level of care is not feasible or is inappropriate    Disposition    Recommended disposition: Inpatient Mental Health       Reviewed case and recommendations with attending provider. Attending Name: Husam Avila MD       Attending concurs with disposition: Yes       Patient and/or validated legal guardian concurs with disposition: No: Patient is on [facility]       Final disposition: Inpatient mental health .     Inpatient Details (if applicable):   Is patient admitted voluntarily:No, 72 hr hold.  Hold start date/time: 0414, 6/29/23      Patient aware of potential for transfer if there is not appropriate placement? NA       [facility]      Behavioral Intake Notified? Yes: Date: 6/29/23 Time: 7:22am.     Outpatient Details (if applicable):       Assessment Details    Patient interview started at: 2:50am and completed at: 3:45am.     Total time spent with the patient or their family: 1.75 hrs      CPT code(s) utilized: 94366 - Psychotherapy for Crisis - 60 (30-74*) min and 38436 - Psychotherapy for Crisis (Each additional 30 minutes) - 30 min        DONITA Chris, Psychotherapist Trainee  [facility] - Triage & Transition Services  Callback: [phone]

## 2023-06-29 NOTE — ED NOTES
"EmPATH Unit - Psychiatric Consultation  [facility] Emergency Department    Carolin Bradley MRN: [id number]   Age: 22 year old YOB: 2000     History     Chief Complaint   Patient presents with     Suicidal     HPI  Carolin Bradley is a 22 year old female with history notable for PTSD who comes from the ED after medical clearance.  She was put on a 72 hour hold due to her getting upset and stating she was suicidal.  She also did some head banging in the ED.  She came to the [facility] for her mental health concerns.  She was telling different stories last night between her friends and what she was telling staff.  She had threatened to jump in front of a light rail.  Her friends in her apartment did not want her to be alone which led to some conflict with them.  The patient does head bang as a way to self injure herself.  This is not a suicide attempt or gesture.  She also occasionally cuts herself.  She is not on medications currently but has tried some in the past.  Most have not helped.  Wellbutrin was \"bad\" but she could not explain what bad was.  Zoloft did not help.  She has taken ativan on her own at times and thought that was helpful.  She was wondering if she could get some of that.      Past Medical History  No past medical history on file.  No past surgical history on file.  escitalopram (LEXAPRO) 10 MG tablet  propranolol (INDERAL) 20 MG tablet      No Known Allergies  Family History  No family history on file.  Social History           Review of Systems  A medically appropriate review of systems was performed with pertinent positives and negatives noted in the HPI, and all other systems negative.    Physical Examination   BP: 120/82  Pulse: 90  Temp: 98  F (36.7  C)  Resp: 18  Height: 162.6 cm (5' 4\")  Weight: 64.2 kg (141 lb 9.6 oz)  SpO2: 96 %    Physical Exam  General: Appears stated age.   Neuro: Alert and fully oriented. Extremities appear to demonstrate normal strength on visual inspection. "   Integumentary/Skin: no rash visualized, normal color    Psychiatric Examination   Appearance: awake, alert, adequately groomed and appeared as age stated  Attitude:  cooperative  Eye Contact:  good  Mood:  sad   Affect:  mood congruent  Speech:  clear, coherent  Psychomotor Behavior:  no evidence of tardive dyskinesia, dystonia, or tics  Thought Process:  logical, linear and goal oriented  Associations:  no loose associations  Thought Content:  no evidence of psychotic thought and passive suicidal ideation present  Insight:  good  Judgement:  intact  Oriented to:  time, person, and place  Attention Span and Concentration:  intact  Recent and Remote Memory:  intact  Language: able to name/identify objects without impairment  Fund of Knowledge: intact with awareness of current and past events    ED Course      Pt was able to sleep through the night with no need for medications.    Labs Ordered and Resulted from Time of ED Arrival to Time of ED Departure - No data to display    Assessments & Plan (with Medical Decision Making)   Patient presenting with . Nursing notes reviewed noting no acute issues. Pt was dysregulated last night and currently has calmed down.  She no longer has any plans of suicide.  She does have passive thoughts that are chronic.  She has no desire to bang her head currently as this happens only when she is very upset.  Mom is comfortable with her coming home and they are planning on spending the week together in [place].  Pt does have some risk factors with her past attempts, mood lability and still having passive thoughts.  She has many safety factors mitigating this risk including strong family support, willingness to take medications and follow up with therapy, only having passive suicidal thoughts that are chronic and being in a much better place than last night.      I have reviewed the assessment completed by the [facility].     Preliminary diagnosis:    ICD-10-CM    1. Suicidal ideation   R45.851            Treatment Plan:  -Will start lexapro 10 mg once a day  -Will start propranolol 20 mg tid prn for anxiety  -Pt will be set up with therapy an psychiatry        Medical Decision Making  The patient's presentation was of high complexity (a chronic illness severe exacerbation, progression, or side effect of treatment).    The patient's evaluation involved:  an assessment requiring an independent historian (mom, see 's note )    The patient's management necessitated high risk (a decision regarding hospitalization).           --  Corona Vaughn MD   [facility] EMERGENCY DEPT  EmPATH Unit       Corona Vaughn MD  06/29/23 [id number]

## 2023-06-29 NOTE — TELEPHONE ENCOUNTER
S: [facility] ED, DEC  Cailin calling at 725AM about a 22 year old/Female presenting with SI       B: Pt arrived via Self . Presenting problem, stressors: SI     Pt affect in ED: calm and pleasant, minimally responsive  briefly upset when notified that she is on a hold, but has calmed since  Pt Dx: Generalized Anxiety Disorder, PTSD and Borderline Personality Disorder  Previous IPMH hx? Yes: [facility] in [place], and somewhere else - last admit about a year ago per mom   Pt endorses SI, no plan   Hx of suicide attempt? Yes: Has had many attempts over last 2 weeks, head banging, jumping in front of train or cars, cutting,   Pt endorses SIB via head banging and cutting, most recent episode yesterday  Pt denies HI   Pt denies hallucinations .   Pt RARS Score: 7    Hx of aggression/violence, sexual offenses, legal concerns, Epic care plan? describe: None  Current concerns for aggression this visit? Yes: agitated when notified on a hold, but has calmed. No physical aggression.   Does pt have a history of Civil Commitment? No  Is Pt their own guardian? Yes    Pt is prescribed medication. Is patient medication compliant? No  Pt denies OP services   CD concerns: pt denies, roommate and mom report daily THC use   Acute or chronic medical concerns: None  Does Pt present with specific needs, assistive devices, or exclusionary criteria? None      Pt is ambulatory  Pt is able to perform ADLs independently      A: Pt to be reviewed for [facility] admission. Pt is on a 72HH, initiated 6/29 @414am   Preferred placement: [facility]    COVID Symptoms: No  If yes, COVID test required   Utox: Not ordered, intake to request lab    CMP: N/A  CBC: N/A  HCG: Not ordered, intake to request lab     R: Patient cleared and ready for behavioral bed placement: Yes  Pt placed on [facility] worklist? Yes

## 2023-06-29 NOTE — ED NOTES
.Discharge instructions reviewed with patient including follow-up care plan. Educated on medication regime and advised not to stop prescribed medication without consulting their physician. Reviewed safety plan and outpatient resources/appointments.Verbalized understanding of discharge instructions. Denies SI. All belongings which where brought into the hospital have been returned to patient.

## 2023-06-29 NOTE — PROGRESS NOTES
IP MH Referral Acuity Rating Score (RARS)    LMHP complete at referral to IP MH, with DEC; and, daily while awaiting IP MH placement. Call score to PPS.  CRITERIA SCORING   New 72 HH and Involuntary for IP MH (not adolescent) 1/1   Boarding over 24 hours 0/1   Vulnerable adult at least 55+ with multiple co morbidities; or, Patient age 11 or under 0/1   Suicide ideation without relief of precipitating factors 1/1   Current plan for suicide 1/1   Current plan for homicide 0/1   Imminent risk or actual attempt to seriously harm another without relief of factors precipitating the attempt 0/1   Severe dysfunction in daily living (ex: complete neglect for self care, extreme disruption in vegetative function, extreme deterioration in social interactions) 1/1   Recent (last 2 weeks) or current physical aggression in the ED 1/1   Restraints or seclusion episode in ED 0/1   Verbal aggression, agitation, yelling, etc., while in the ED 1/1   Active psychosis with psychomotor agitation or catatonia 0/1   Need for constant or near constant redirection (from leaving, from others, etc).  0/1   Intrusive or disruptive behaviors 1/1   TOTAL Acuity Total Score: 7       DONITA Chris on 6/29/2023 at 7:22 AM

## 2023-06-29 NOTE — ED NOTES
"21 year old female received to empath due to suicidal and homicidal threats. History of chronic suicidal ideation-attempts to distract herself by engaging in self injurious behavior (head banging and cutting) Currently on a 72 hours hold. Reports that she is open to OP therapy to have assistance to address stressors but feels Inpatient treatment  would not be helpful. Reports being stressed by issues with partner who is now facing homelessness, father dying of cancer and brother \"killing himself with meth\". Works part-time and does like her job. Lives with 3 roommate and likes her living situation. Does not have a current therapist. Is not on any medication. Smokes marijuana everyday to treat anxiety. States \"if I don't smoke I start to get nauseated\" Denies any other substance use. Denies current suicidal or homicidal ideation.  Nursing assessment complete including patient and medication profiles. Risk assessments completed addressing suicide,fall,skin,nutrition and safety issues. Care plan initiated. Assessments reviewed with physician and admit orders received. Video monitoring in progress, Patient Informed.   "

## 2023-06-29 NOTE — PROGRESS NOTES
Writer accompanied RN to notify patient of 72hour hold. Patient was upset and kept saying that she was being cut off from everyone but also that all of her friends had betrayed her by bringing her here. Patient states there is no reason for her to be here despite saying she is sure someday she will end up killing herself and her way to not attempt suicide is bang her head on objects and walls to the point of bleeding.     Explained to patient that she is unable to come to [facility] while displaying agitated and aggressive behaviors.  This is for the safety of all patients and staff on the unit. Informed patient that in order to come to the unit, she would need to refrain from acting on self harm urges. Staff are available on the unit to assist if she struggles with managing the urges on her own. Patient minimized NSSI via head banging compared her head bashing to football players often having multiple concussions and brain damage; stating that it fine if she does compared her head bashing to football players often having multiple concussions and brain damage; stating that it fine if she does. Informed patient that Em[facility] could be re-evaluation as an option when pt is able to display calm and cooperative behavior which includes refraining from any NSSI on the unit. Staff are available to reassess at which time patient feels she is able to commit to this type of behavior.    DONITA Chris on 6/29/2023 at 5:04 AM

## 2023-06-29 NOTE — DISCHARGE INSTRUCTIONS
"Start lexapro 10 mg once a day  Start propranolol 20 mg up to 3 times a day as needed for anxiety  Follow up with therapy and psychiatry when scheduled    Type: Therapy - Initial (In-Person)  Date: Monday, 7/10/2023  Time: 3:00 pm - 3:55 pm  Provider: Hilda Medrano  Location: [facility], [address] [place] [address], [address], [place] [address]  Phone: [phone]  Patient Instructions: You've been scheduled for an appointment at [facility] through [facility]. Please watch your EMAIL inbox/junk folder for appointment confirmation, including our CLIENT PAPERWORK. Paperwork URL: [email or web address] Paperwork must be completed prior to the appointment. Appointments will be rescheduled if paperwork is not received in advance. If you have questions or need to cancel or reschedule I can be reached at --\"[email or web address]    Primary Care appointment  [facility]  Tuesday, July 11, 2023 at 10:05am  Leyla Morales, provider  She can make referral to neurology and medication management.     Aftercare Plan  If I am feeling unsafe or I am in a crisis, I will:   Contact my established care providers   Call the National Suicide Prevention Lifeline: 988  Go to the nearest emergency room   Call 911     Warning signs that I or other people might notice when a crisis is developing for me: agitation, irritable, pushing people away    Things I am able to do on my own to cope or help me feel better: play with Baby (pet rat), yell into a pillow, use a pillow to soften head banging, call [facility] crisis     Things that I am able to do with others to cope or help me better: video games, watch movies, read a book, play cards or board games, go for walk     Things I can use or do for distraction: phone scrolling, Baby, hold ice cubes, snap a rubber band     Changes I can make to support my mental health and wellness: medications and more " "coping skills, engage in individual therapy     People in my life that I can ask for help: mom     Your county has a mental health crisis team you can call 24/7: [facility]  [phone] (adults)  [phone] (children)    Other things that are important when I'm in crisis: do not gaslight me, give me space if I asked     Additional resources and information: new therapy appointment at [facility] and new PCP appointment      Crisis Lines  Crisis Text Line  Text 685225  You will be connected with a trained live crisis counselor to provide support.  Por espanol, texto  JULIA a 647447 o texto a 442-AYUDAME en WhatsApp  [employer] (LGBTQ Youth Crisis Line)  9.270.861.4032  text START to 732-678    [place] Mental [facility] Warm Line  Peer to peer support  Monday  thru Saturday, 12 pm to 10 pm  970.797.2201 or 0.871.840.1188  Text  \"Support\" to 94617    RateItAll  Fast Tracker  Linking people to mental health and substance use disorder resources  [employer].org   [place] Mental [facility] Warm Line  Peer to peer support  Monday thru Saturday, 12 pm to 10 pm  237.547.9600 or 3.134.732.7753  Text \"Support\" to 33128  National [place] on Mental Illness (CONCEPCION)  376.660.5420 or 1.888.CONCEPCION.HELPS    Mental Health Apps  My3  https://my[employer]pp.org/  VirtualHopeBox  https://[employer].org/apps/virtual-hope-box/  Apps: Calm, Insight Timer, and Headspace    Additional Information  Today you were seen by a licensed mental health professional through Triage and Transition services, [facility] ([facility])  for a crisis assessment in the Emergency Department at [facility].  It is recommended that you follow up with your established providers (psychiatrist, mental health therapist, and/or primary care doctor - as relevant) as soon as possible. Coordinators from P will be calling you in the next 24-48 hours to ensure that you have the resources you " need.  You can also contact [facility] coordinators directly at [phone]. You may have been scheduled for or offered an appointment with a mental health provider. [facility] maintains an extensive network of licensed behavioral health providers to connect patients with the services they need.  We do not charge providers a fee to participate in our referral network.  We match patients with providers based on a patient's specific needs, insurance coverage, and location.  Our first effort will be to refer you to a provider within your care system, and will utilize providers outside your care system as needed.

## 2023-06-29 NOTE — ED NOTES
RN spoke to pt's roommate, João, and updated him on where she is and the tentative plan. João is agreeable to being collateral for pt's care and pt gave RN verbal okay to share information with him. His phone number is listed below:    [phone]

## 2023-06-29 NOTE — ED NOTES
Gave pt toothpaste and toothbrush. RN called empath unit and told them pt was ready to be brought over to the unit.

## 2023-06-29 NOTE — PROGRESS NOTES
"[facility] Crisis Reassessment      Carolin Bradley was reassessed for the following reasons: observation and discharge recommendation. Pt was first seen on 6/29/2023 at 0250 by DONITA Harmon; see the initial assessment note for details.      Patient Presentation    Initial ED presentation details: suicidal    Current patient presentation: Patient was transferred to [facility] this morning and displayed cooperative behavior with underlying anxiety due to being in the ED.  Patient met with writer expressed feeling inpatient admission would not be helpful for her as she should not be here regardless. Patient reports feeling baseline suicidal ideations and was needing space at home yet her roommates would like let her.  Patient is upset that her roommates called the [facility] crisis team \"after the crisis was over.\"     Patient reports that she does not have current plan for suicide and has not attempted recently.  She admits to engaging in more self harm behaviors like head banging and cutting.  Patient feels like she might have brain damage due to the history of head banging she has done.  She reports that it is 10 year habit.     Patient is not on current medications yet has some insight into what could be beneficial.  She believes a mood stabilizer would be most helpful.  Patient notes that anxiety is secondary.     We discussed in length safety planning in order to discharge home.  Patient feels ok returning home with there roommates even though she does not get support from them.  She spoke about going to [place] to spend time with her mother over the weekend and for the [place] Day holiday.  Patient does not feel locking her cutting utensil () in a lock box would be effective barrier as \"cutting is not the first time I go to in self harm.\"  Patient engaged in discussion about using a pillow to soften the surface of which she head bangs on.  We also explored using TIPP strategies to evoke changes in body " temperature as a distraction.     Patient is open to scheduling outpatient appointments with therapy and medication management.  We also explored the possibility of using medical transportation with PMAP to get to/from appointments.       Risk of Harm    [place] Suicide Severity Rating Scale Full Clinical Version: 6/29/2023 at 0250  Suicidal Ideation  1. Wish to be Dead (Lifetime): Yes  1. Wish to be Dead (Past 1 Month): No  2. Non-Specific Active Suicidal Thoughts (Lifetime): Yes  2. Non-Specific Active Suicidal Thoughts (Past 1 Month): Yes  3. Active Suicidal Ideation with any Methods (Not Plan) Without Intent to Act (Lifetime): Yes  3. Active Suicidal Ideation with any Methods (Not Plan) Without Intent to Act (Past 1 Month): No  4. Active Suicidal Ideation with Some Intent to Act, Without Specific Plan (Lifetime): Yes  4. Active Suicidal Ideation with Some Intent to Act, Without Specific Plan (Past 1 Month): No  5. Active Suicidal Ideation with Specific Plan and Intent (Lifetime): Yes  5. Active Suicidal Ideation with Specific Plan and Intent (Past 1 Month): No  Intensity of Ideation  Most Severe Ideation Rating (Lifetime): 5  Most Severe Ideation Rating (Past 1 Month): 3  Frequency (Lifetime): 2-5 times in week  Frequency (Past 1 Month): 2-5 times in week  Duration (Lifetime): 1-4 hours/a lot of time  Duration (Past 1 Month): 1-4 hours/a lot of time  Controllability (Lifetime): Can control thoughts with a lot of difficulty  Controllability (Past 1 Month): Can control thoughts with some difficulty  Deterrents (Lifetime): Deterrents definitely stopped you from attempting suicide  Deterrents (Past 1 Month): Deterrents definitely stopped you from attempting suicide  Reasons for Ideation (Lifetime): Completely to end or stop the pain (You couldn't go on living with the pain or how you were feeling)  Reasons for Ideation (Past 1 Month): Completely to end or stop the pain (You couldn't go on living with the pain or  how you were feeling)  Suicidal Behavior  Actual Attempt (Lifetime): No  Has subject engaged in non-suicidal self-injurious behavior? (Lifetime): Yes  Has subject engaged in non-suicidal self-injurious behavior? (Past 3 Months): Yes  Interrupted Attempts (Lifetime): No  Aborted or Self-Interrupted Attempt (Lifetime): No  Preparatory Acts or Behavior (Lifetime): No  C-SSRS Risk (Lifetime/Recent)  Calculated C-SSRS Risk Score (Lifetime/Recent): Moderate Risk    [place] Suicide Severity Rating Scale Since Last Contact: 6/29/2023 at 1200  Suicidal Ideation (Since Last Contact)  1. Wish to be Dead (Since Last Contact): No  2. Non-Specific Active Suicidal Thoughts (Since Last Contact): No  Suicidal Behavior (Since Last Contact)  Actual Attempt (Since Last Contact): No  Has subject engaged in non-suicidal self-injurious behavior? (Since Last Contact): No  Interrupted Attempts (Since Last Contact): No  Aborted or Self-Interrupted Attempt (Since Last Contact): No  Preparatory Acts or Behavior (Since Last Contact): No  Suicide (Since Last Contact): No     C-SSRS Risk (Since Last Contact)  Calculated C-SSRS Risk Score (Since Last Contact): No Risk Indicated    Validity of evaluation is not impacted by presenting factors during interview.   Comments regarding subjective versus objective responses to Depue tool: n/a  Environmental or Psychosocial Events: loss of relationship due to divorce/separation, threats to a prized relationship, work or task failure, challenging interpersonal relationships, geographic isolation from supports, barriers to accessing healthcare, helplessness/hopelessness, impulsivity/recklessness, unemployment/underemployment, other life stressors, ongoing abuse of substances and social isolation  Chronic Risk Factors: history of suicide attempts (continous attempts over last 2 weeks), history of psychiatric hospitalization, chronic and ongoing sleep difficulties, parent divorce, serious, persistent  mental illness, personality disorders and history of Non-Suicidal Self Injury (NSSI)   Warning Signs: seeking access to means to hurt or kill self, talking or writing about death, dying, or suicide, hopelessness, pain (new or worsening), rage, anger, seeking revenge, acting reckless or engaging in risky activities, feeling trapped, like there is no way out, increasing substance use or abuse, withdrawing from friends, family, and society, anxiety, agitation, unable to sleep, sleeping all the time, dramatic changes in mood, no reason for living, no sense of purpose in life and engaging in self-destructive behavior  Protective Factors: lives in a responsibly safe and stable environment  Interpretation of Risk Scoring, Risk Mitigation Interventions and Safety Plan:  Patient is denying current presence of SI however notes that it is chronic and has no intent of following through with actions.     Does the patient have thoughts of harming others? No    Mental Status Exam   Affect: Appropriate and Flat   Appearance: Appropriate    Attention Span/Concentration: Attentive?    Eye Contact: Engaged   Fund of Knowledge: Appropriate    Language /Speech Content: Fluent   Language /Speech Volume: Normal    Language /Speech Rate/Productions: Normal    Recent Memory: Intact   Remote Memory: Intact   Mood: Anxious, Normal and Sad    Orientation to Person: Yes    Orientation to Place: Yes   Orientation to Time of Day: Yes    Orientation to Date: Yes    Situation (Do they understand why they are here?): Yes    Psychomotor Behavior: Normal    Thought Content: Clear   Thought Form: Goal Directed and Intact       Additional Collateral Information   LVM for mom x2 ([id number] oci [id number]) to coordinate discharge care    Mother called backed to provide information of being ok with discharge disposition and asked about plans to come get patient today despite mother living four hours away in [place].  Mom note that they have talked about patient  coming to stay with mom in [place] for at least one week.       Therapeutic Intervention  The following therapeutic methodologies were employed when working with the patient: Establishing rapport, Active listening, Assess dimensions of crisis, Apply solution-focused therapy to address current crisis, Identify additional supports and alternative coping skills, Establish a discharge plan and Safety planning. Patient response to intervention: engaged.    Diagnosis:   301.83 (F60.3) Borderline Personality Disorder   309.81 (F43.10) Posttraumatic Stress Disorder (includes Posttraumatic Stress Disorder for Children 6 Years and Younger)  With dissociative symptoms - by history   311 (F32.9) Unspecified Depressive Disorder  - by history     Clinical Substantiation of Recommendations  Patient has been able to remain calm and cooperative on [facility] during her stay with us.  She has engaged in productive, future oriented planning, and brief therapy interventions.  Patient is willing to schedule with outpatient providers for therapy and psychiatry however opted to have new PCP provider managed medications since psychiatry providers were too far geographically.  Patient endorses chronic SI with no thoughts currently present at time of assessment and discharge.  Patient will start medications and follow up with new providers that were scheduled today.  Patient will be cabbed home.     Plan:    Disposition  Recommended disposition: Individual Therapy and Medication Management      Reviewed case and recommendations with attending provider. Attending Name: Johana      Attending concurs with disposition: Yes      Patient and/or verified legal guardian concurs with disposition: Yes      Final disposition: Individual therapy  and Medication management.         Assessment Details  Total duration spent on the patient case in minutes: .75 hrs     CPT code(s) utilized: 14117 - Psychotherapy (with patient) - 45 (38-52*) min       Misha  "ENRIQUE Christie, [facility]  Callback: [phone]    Aftercare Plan  If I am feeling unsafe or I am in a crisis, I will:   Contact my established care providers   Call the National Suicide Prevention Lifeline: 988  Go to the nearest emergency room   Call 911      Warning signs that I or other people might notice when a crisis is developing for me: agitation, irritable, pushing people away     Things I am able to do on my own to cope or help me feel better: play with Baby (pet rat), yell into a pillow, use a pillow to soften head banging, call [facility]      Things that I am able to do with others to cope or help me better: video games, watch movies, read a book, play cards or board games, go for walk      Things I can use or do for distraction: phone scrolling, Baby, hold ice cubes, snap a rubber band      Changes I can make to support my mental health and wellness: medications and more coping skills, engage in individual therapy      People in my life that I can ask for help: mom      Your [facility] has a mental health crisis team you can call 24/7: [facility] Mobile Crisis  [phone] (adults)  [phone] (children)     Other things that are important when I'm in crisis: do not gaslight me, give me space if I asked      Additional resources and information: new therapy appointment at [facility] and new PCP appointment       Crisis Lines  Crisis Text Line  Text 779681  You will be connected with a trained live crisis counselor to provide support.  Por espanol, texto  JULIA a 057496 o texto a 442-AYUDAME en WhatsApp  [employer] (LGBTQ Youth Crisis Line)  1.625.149.3609  text START to 850-346     [facility][note 3] Warm Line  Peer to peer support  Monday  thru Saturday, 12 pm to 10 pm  [phone] or 1.116.460.4348  Text  \"Support\" to 50569     Community Resources  Fast Tracker  Linking people to mental health and substance use disorder resources  fasttrackermn.org " "  [facility] Warm Line  Peer to peer support  Monday thru Saturday, 12 pm to 10 pm  063.031.9876 or 6.519.963.9113  Text \"Support\" to 39428  National [place] on Mental Illness (CONCEPCION)  215.346.1831 or 1.888.CONCEPCION.HELPS     Mental Health Apps  My3  https://Tagent.SoundFit/  VirtualHopeBox  https://Intelomed/apps/virtual-hope-box/  Apps: Calm, Insight Timer, and Headspace     Additional Information  Today you were seen by a licensed mental health professional through Triage and Transition services, Behavioral Healthcare Providers ([facility])  for a crisis assessment in the Emergency Department at [facility].  It is recommended that you follow up with your established providers (psychiatrist, mental health therapist, and/or primary care doctor - as relevant) as soon as possible. Coordinators from [facility] will be calling you in the next 24-48 hours to ensure that you have the resources you need.  You can also contact [facility] coordinators directly at [phone]. You may have been scheduled for or offered an appointment with a mental health provider. [facility] maintains an extensive network of licensed behavioral health providers to connect patients with the services they need.  We do not charge providers a fee to participate in our referral network.  We match patients with providers based on a patient's specific needs, insurance coverage, and location.  Our first effort will be to refer you to a provider within your care system, and will utilize providers outside your care system as needed.    "

## 2023-06-29 NOTE — TELEPHONE ENCOUNTER
R:  Shandra called intake @ 11:17am re: Pt   Intake called Shandra back @11:44am Shandra reports called ED to request covid and additional labs  Labs to be completed and resulted @11:47am

## 2023-06-29 NOTE — ED NOTES
Bed: [facility]  Expected date:   Expected time:   Means of arrival:   Comments:  [facility] [place] [age]F SI, PD riding with

## 2023-06-29 NOTE — ED NOTES
Quiet. Calm. Resting most of the afternoon. Seen  by [facility] and psychiatry. 72 hour hold dropped. Medication for discharge prescribed and safety plan put in place.

## 2023-06-29 NOTE — ED PROVIDER NOTES
"  History     Chief Complaint:  Suicidal     The history is provided by the patient.      Gabrielle Shea is a 22 year old female who presents to the ED with suicidal ideation. Patient's friends today called a  regarding the patient.  than dialed the police who brought her into the hospital. Patient denies having suicidal ideation. However, there are reports of her banging her head against the wall and wanting to jump in front of a light rail.    Independent Historian:   None - Patient Only    Review of External Notes:   1/9/2023  [facility]  Internal Medicine  Borderline personality disorder (H) +2 more  Dx  ER F/U   Medication Request  Reason for Visit       Medications:    See epic chart    Past Medical History:    Borderline personality disorder    Past Surgical History:    No past surgical history on file.     Physical Exam     Patient Vitals for the past 24 hrs:   BP Temp Temp src Pulse Resp SpO2 Height Weight   06/29/23 1001 120/74 98.4  F (36.9  C) Oral 94 -- 97 % -- 64.2 kg (141 lb 9.6 oz)   06/29/23 0934 -- -- -- -- -- -- 1.626 m (5' 4\") --   06/29/23 0015 122/84 -- -- 88 -- -- -- --   06/28/23 2141 120/82 98  F (36.7  C) Temporal 81 18 98 % -- --   06/28/23 2138 120/82 -- -- 90 -- 96 % -- --       Physical Exam  General: Alert, No distress. Nontoxic appearance  Head: No signs of trauma.   Mouth/Throat: Oropharynx moist.   Eyes: Conjunctivae are normal. Pupils are equal..   Neck: Normal range of motion.    CV: Appears well perfused.  Resp:No respiratory distress.   MSK: Normal range of motion. No obvious deformity.   Neuro: The patient is alert and interactive. HIGGINS. Speech normal. GCS 15  Skin: No lesion or sign of trauma noted.   Psych: normal mood and affect. behavior is normal.  Denies current suicidal ideation      Emergency Department Course     Emergency Department Course & Assessments:    PSS-3    Date and Time Over the " past 2 weeks have you felt down, depressed, or hopeless? Over the past 2 weeks have you had thoughts of killing yourself? Have you ever attempted to kill yourself? When did this last happen? User   06/28/23 2149 yes yes yes within the last month (but not today)       C-SSRS ([place])    Date and Time Q1 Wished to be Dead (Past Month) Q2 Suicidal Thoughts (Past Month) Q3 Suicidal Thought Method Q4 Suicidal Intent without Specific Plan Q5 Suicide Intent with Specific Plan Q6 Suicide Behavior (Lifetime) Within the Past 3 Months? RETIRED: Level of Risk per Screen Screening Not Complete User   06/28/23 2149 yes yes yes no yes yes -- -- -- RC              Suicide assessment completed by mental health (D.E.C., LCSW, etc.)    Interventions:  Medications - No data to display     Assessments:  2307 I obtained the history and examined the patient as noted above    Independent Interpretation (X-rays, CTs, rhythm strip):  None    Consultations/Discussion of Management or Tests:  None      Social Determinants of Health affecting care:   Healthcare Access/Compliance and Stress/Adjustment Disorders    Disposition:  The patient was transferred to [facility].     Impression & Plan      Medical Decision Making:  This patient is presenting to the ER with concerns of suicidal ideation.  She denies suicidal ideation currently but collateral information states that she has been threatening to kill herself.  She has a history of similar presentations.  No medical issues appear to be contributing to this episode.  She denies current use of street drugs or heavy alcohol use.  She is medically cleared for transfer to the empath unit for further evaluation.    Diagnosis:    ICD-10-CM    1. Suicidal ideation  R45.851            Scribe Disclosure:  I, Joseph Martin, am serving as a scribe at 1:52 AM on 6/29/2023 to document services personally performed by Joseph Whitley MD based on my observations and the provider's statements to me.      6/28/2023   Joseph Whitley MD Joing, Todd Roger, MD  06/29/23 5824

## 2023-06-29 NOTE — ED TRIAGE NOTES
"BIBA from home; roommate called 911 d/t pt expressing SI with a plan to step in front of light rail. Pt's roommate called crisis line and then EMS/PD brought pt to hospital. This RN spoke to [facility] Crisis team , who has been working closely with the patient and reports the following: \"Pt has hx of multiple TBIs, borderline personality disorder, anxiety, and depression, as well as PTSD. We have been trying to work with Gabrielle but she shows no interest in getting help, despite being given multiple resources. Pt has been at home banging her head on the wall with the intent to kill herself. Pt does not currently take any medication, but tried Wellbutrin for about a month.\" Pt is calm and cooperative, and taken out of handcuffs on arrival in ED.      Triage Assessment     Row Name 06/28/23 1547       Triage Assessment (Adult)    Airway WDL WDL       Respiratory WDL    Respiratory WDL WDL       Skin Circulation/Temperature WDL    Skin Circulation/Temperature WDL WDL       Cardiac WDL    Cardiac WDL WDL       Peripheral/Neurovascular WDL    Peripheral Neurovascular WDL WDL       Cognitive/Neuro/Behavioral WDL    Cognitive/Neuro/Behavioral WDL WDL              "

## 2023-06-29 NOTE — PROGRESS NOTES
[facility] Progress Note    Client Name: Carolin Bradley  Date: June 29, 2023  Service Type:  Group Therapy  Session Start Time:  1010  Session End Time: 1045  Session Length: 35  Attendees: Patient and other group members  Facilitator: MARILY Nunn       Topic:   Morning Group Goal Sheet    Intervention:    Group process: support, challenge, affirm, psycho-education.     Response:  Patient did participate in group. Behavior in group was calm, cooperative, and engaged. Patient shared goal of discussing with her care team about discharging.       MARILY Nunn

## 2023-06-29 NOTE — ED NOTES
Pt upset and wants to call her mother. Pt given the phone by the window and is crying while talking to her mother.

## 2024-06-02 ENCOUNTER — HEALTH MAINTENANCE LETTER (OUTPATIENT)
Age: 24
End: 2024-06-02

## 2025-06-15 ENCOUNTER — HEALTH MAINTENANCE LETTER (OUTPATIENT)
Age: 25
End: 2025-06-15